# Patient Record
Sex: MALE | Race: WHITE | NOT HISPANIC OR LATINO
[De-identification: names, ages, dates, MRNs, and addresses within clinical notes are randomized per-mention and may not be internally consistent; named-entity substitution may affect disease eponyms.]

---

## 2017-08-09 ENCOUNTER — RECORDS - HEALTHEAST (OUTPATIENT)
Dept: ADMINISTRATIVE | Facility: OTHER | Age: 72
End: 2017-08-09

## 2017-08-14 ENCOUNTER — HOSPITAL ENCOUNTER (OUTPATIENT)
Dept: NUCLEAR MEDICINE | Facility: HOSPITAL | Age: 72
Discharge: HOME | End: 2017-08-14
Attending: INTERNAL MEDICINE

## 2017-08-14 ENCOUNTER — COMMUNICATION - HEALTHEAST (OUTPATIENT)
Dept: TELEHEALTH | Facility: CLINIC | Age: 72
End: 2017-08-14

## 2017-08-14 DIAGNOSIS — C61 PROSTATE CA (H): ICD-10-CM

## 2017-08-14 ASSESSMENT — MIFFLIN-ST. JEOR: SCORE: 1397.37

## 2018-01-01 ENCOUNTER — AMBULATORY - HEALTHEAST (OUTPATIENT)
Dept: FAMILY MEDICINE | Facility: CLINIC | Age: 73
End: 2018-01-01

## 2018-01-01 ENCOUNTER — HOME CARE/HOSPICE - HEALTHEAST (OUTPATIENT)
Dept: HOSPICE | Facility: HOSPICE | Age: 73
End: 2018-01-01

## 2018-01-01 ENCOUNTER — AMBULATORY - HEALTHEAST (OUTPATIENT)
Dept: HOSPICE | Facility: HOSPICE | Age: 73
End: 2018-01-01

## 2018-01-01 ENCOUNTER — INFUSION THERAPY VISIT (OUTPATIENT)
Dept: INFUSION THERAPY | Facility: CLINIC | Age: 73
End: 2018-01-01
Attending: FAMILY MEDICINE
Payer: COMMERCIAL

## 2018-01-01 ENCOUNTER — RECORDS - HEALTHEAST (OUTPATIENT)
Dept: ADMINISTRATIVE | Facility: OTHER | Age: 73
End: 2018-01-01

## 2018-01-01 ENCOUNTER — HOSPITAL ENCOUNTER (OUTPATIENT)
Dept: CT IMAGING | Facility: HOSPITAL | Age: 73
Discharge: HOME | End: 2018-04-23
Attending: INTERNAL MEDICINE

## 2018-01-01 ENCOUNTER — HOSPITAL ENCOUNTER (OUTPATIENT)
Dept: LAB | Facility: CLINIC | Age: 73
Discharge: HOME | End: 2018-06-07
Attending: FAMILY MEDICINE | Admitting: FAMILY MEDICINE
Payer: MEDICARE

## 2018-01-01 ENCOUNTER — HOSPITAL ENCOUNTER (OUTPATIENT)
Dept: MRI IMAGING | Facility: HOSPITAL | Age: 73
Discharge: HOME | End: 2018-04-23
Attending: INTERNAL MEDICINE

## 2018-01-01 DIAGNOSIS — C61 PROSTATE CANCER (H): ICD-10-CM

## 2018-01-01 DIAGNOSIS — D64.9 ANEMIA: Primary | ICD-10-CM

## 2018-01-01 DIAGNOSIS — R20.0 NUMBNESS: ICD-10-CM

## 2018-01-01 LAB
CREAT BLD-MCNC: 0.8 MG/DL (ref 0.7–1.3)
HGB BLD-MCNC: 10.3 G/DL (ref 13.3–17.7)

## 2018-01-01 PROCEDURE — 86900 BLOOD TYPING SEROLOGIC ABO: CPT | Performed by: FAMILY MEDICINE

## 2018-01-01 PROCEDURE — 86850 RBC ANTIBODY SCREEN: CPT | Performed by: FAMILY MEDICINE

## 2018-01-01 PROCEDURE — 85018 HEMOGLOBIN: CPT

## 2018-01-01 PROCEDURE — 86901 BLOOD TYPING SEROLOGIC RH(D): CPT | Performed by: FAMILY MEDICINE

## 2018-01-01 PROCEDURE — 36415 COLL VENOUS BLD VENIPUNCTURE: CPT

## 2018-01-01 ASSESSMENT — MIFFLIN-ST. JEOR: SCORE: 1471.08

## 2018-06-07 PROBLEM — D64.9 ANEMIA: Status: ACTIVE | Noted: 2018-01-01

## 2018-06-07 NOTE — PROGRESS NOTES
Infusion Nursing Note:  Adeel Castano presents today for 1 unit PRBC's - which was not administered.  Spoke to pt and his wife in the lobby - pt not seen in clinic.  Patient seen by provider today: No   present during visit today: Not Applicable.    Note: pt's hgb = 10.2 today. Adeel denied being symtptomatic and stated that he is feeling really good today and did not wish to stay for transfusion.    Intravenous Access:  n/a.    Treatment Conditions:  Results reviewed, labs did NOT meet treatment parameters: see above note.      Post Infusion Assessment:  n/a.    Discharge Plan:   Patient discharged in stable condition accompanied by: wife.  Departure Mode: Ambulatory.    Mary Gan RN

## 2018-06-07 NOTE — MR AVS SNAPSHOT
After Visit Summary   6/7/2018    Adeel Castano    MRN: [id number]           Patient Information     Date Of Birth          1945        Visit Information        Provider Department      6/7/2018 9:30 AM ROOM 1 [facility]        Today's Diagnoses     Anemia    -  1       Follow-ups after your visit        Future tests that were ordered for you today     Open Standing Orders        Priority Remaining Interval Expires Ordered    Transfuse red blood cell unit Routine 1/1 TRANSFUSE 1 UNIT  6/7/2018            Who to contact     If you have questions or need follow up information about today's clinic visit or your schedule please contact [facility] directly at [phone].  Normal or non-critical lab and imaging results will be communicated to you by MyChart, letter or phone within 4 business days after the clinic has received the results. If you do not hear from us within 7 days, please contact the clinic through MyChart or phone. If you have a critical or abnormal lab result, we will notify you by phone as soon as possible.  Submit refill requests through [employer] or call your pharmacy and they will forward the refill request to us. Please allow 3 business days for your refill to be completed.          Additional Information About Your Visit        Care EveryWhere ID     This is your Care EveryWhere ID. This could be used by other organizations to access your [place] medical records  [id number]         Blood Pressure from Last 3 Encounters:   11/20/15 142/88   10/13/09 122/76    Weight from Last 3 Encounters:   11/20/15 120.2 kg (265 lb)   10/13/09 112.5 kg (248 lb)              We Performed the Following     ABO/Rh type and screen     Hemoglobin        Primary Care Provider Office Phone # Fax #    [facility] [phone] [id number] [facility] [id number]        Equal Access to Services     REAGAN GOYAL AH: Joceline Martínez  waneidaterry ybarra, [place], zarina ornelaskorey ah. So [facility] [phone].    ATENCIÓN: Si habla dali, tiene a nam disposición servicios gratuitos de asistencia lingüística. Teagan al [phone].    We comply with applicable federal civil rights laws and [place] laws. We do not discriminate on the basis of race, color, national origin, age, disability, sex, sexual orientation, or gender identity.            Thank you!     Thank you for choosing [facility]  for your care. Our goal is always to provide you with excellent care. Hearing back from our patients is one way we can continue to improve our services. Please take a few minutes to complete the written survey that you may receive in the mail after your visit with us. Thank you!             Your Updated Medication List - Protect others around you: Learn how to safely use, store and throw away your medicines at www.disposemymeds.org.          This list is accurate as of 6/7/18 10:42 AM.  Always use your most recent med list.                   Brand Name Dispense Instructions for use Diagnosis    NO ACTIVE MEDICATIONS      .        triamcinolone 0.5 % cream    KENALOG    30 g    Apply sparingly to affected area three times daily.    Rash

## 2018-10-31 ENCOUNTER — HOME CARE/HOSPICE - HEALTHEAST (OUTPATIENT)
Dept: HOSPICE | Facility: HOSPICE | Age: 73
End: 2018-10-31

## 2018-11-02 ENCOUNTER — HOME CARE/HOSPICE - HEALTHEAST (OUTPATIENT)
Dept: HOSPICE | Facility: HOSPICE | Age: 73
End: 2018-11-02

## 2021-05-31 VITALS — HEIGHT: 75 IN | BODY MASS INDEX: 16.16 KG/M2 | WEIGHT: 130 LBS

## 2021-06-01 VITALS — HEIGHT: 72 IN | WEIGHT: 155 LBS | BODY MASS INDEX: 20.99 KG/M2

## 2021-06-21 NOTE — PROGRESS NOTES
HOSPICE NP FACE TO FACE VISIT ON 10/30/2018 AT CLIENT'S HOME IN [facility] with WIFE-HECTOR, DAUGHTER-SHOSHANA AND TO WARD-HOSPICE RN CASE MANAGER PRESENT:    Summary: Adeel Castano is a  73 y.o.  male  on hospice for a primary diagnosis of metastatic prostate cancer.  The original referral to hospice was in May of 2018. Today represents the first face to face visit with this client.    Significant comorbidities/coexisting conditions include:  Patient Active Problem List   Diagnosis     Lower back pain     Prostate cancer metastatic to bone (H)     Cough     Fever     Pain in both lower extremities     Failure to thrive in adult     Cancer related pain     Nausea and vomiting     Constipation     Palliative care encounter     Generalized weakness     Fatigue     Anemia, unspecified type     Past Medical History:   Diagnosis Date     Herniated lumbar disc without myelopathy     Bulging herniated disc of L4-L5 per pt.     Prostate cancer metastatic to bone (H)        Current Outpatient Prescriptions   Medication Sig Dispense Refill     acetaminophen (TYLENOL) 500 MG tablet Take 1,000 mg by mouth 3 (three) times a day. Indications: Pain       dexamethasone (DECADRON) 4 MG tablet Take 1 tablet (4 mg total) by mouth 2 (two) times a day. 60 tablet 0     DEXAMETHASONE, BULK, MISC Take 4 mg by mouth see administration instructions. Dexamethasone 4mg/ml -- give 4mg daily- OK to D/C when pt no longer able to swallow .TO Sandie Pro NP/Eladia Melo RN  Indications: inflammation       diphenhydrAMINE 20 mL in aluminum-magnesium hydroxide-simethicone 400-400-40 mg/5 mL 20 mL Swish and spit 10 mL 4 (four) times a day as needed (mouth sores). Indications: mouth sores       haloperidol (HALDOL) 2 mg/mL solution Take 1 mg by mouth every 2 (two) hours as needed (delirium, restlessness). TO Sandie Pro NP/Eladia Melo RN  Indications: Delirium, nausea       haloperidol (HALDOL) 2 mg/mL solution Take 1 mg by mouth every 8 (eight)  hours. TO Sandie Pro NP/Eladia Melo RN  Indications: Delirium, restlessness       HYDROmorphone (DILAUDID) 10 mg/mL Liqd solution Take 8-12 mg by mouth every 4 (four) hours as needed (pain). Indications: Pain       LORazepam (LORAZEPAM INTENSOL) 2 mg/mL concentrated solution Take 0.5-1 mg by mouth every 6 (six) hours as needed (anxiety). Indications: anxiety       magnesium hydroxide (MAGNESIUM HYDROXIDE) 400 mg/5 mL Susp suspension Take 15-30 mL by mouth daily as needed (constipation). Indications: constipation       methadone (DOLOPHINE) 10 mg/mL solution Take 5 mg by mouth every 12 (twelve) hours. Indications: Severe Pain, Chronic Pain       metroNIDAZOLE, bulk, Powd Apply 1 application topically daily. Indications: odor from coccyx wound       ondansetron (ZOFRAN) 8 MG tablet Take 8 mg by mouth every 12 (twelve) hours as needed for nausea. Per hospice admission orders.  Indications: nausea       POLYETHYLENE GLYCOL, BULK, MISC Take 17 g by mouth daily as needed (constipation). Per hospice admission orders.  Indications: constipation       No current facility-administered medications for this visit.      Functional status: Adeel resides in his own home with his wife.  He is currently actively dying and unresponsive.    Physical examination:     VS: BP62/palp, -irregular, RR 28, SaO2 95% right mid arm circumference not performed. PPS 10%. Weight: Not done.    General: Elderly man laying in bed in his home with head turned to the right side, eyes closed.  He is breathing shallowly.  He is unresponsive.  Breath sounds are diminished.  Heart sounds very distant, faint. Abdomen soft, nondistended, no bowel sounds noted.  There is mottling of the bottoms of his feet and his knees.  Discussed with his family that he will probably pass today. They verbalized understanding and will be notifying other family.  End of life education continued by Faith PARISI.    CLARISA Conner/CNP  [facility]